# Patient Record
Sex: MALE | Race: WHITE | NOT HISPANIC OR LATINO | URBAN - METROPOLITAN AREA
[De-identification: names, ages, dates, MRNs, and addresses within clinical notes are randomized per-mention and may not be internally consistent; named-entity substitution may affect disease eponyms.]

---

## 2021-05-23 ENCOUNTER — EMERGENCY (EMERGENCY)
Facility: HOSPITAL | Age: 26
LOS: 0 days | Discharge: HOME | End: 2021-05-23
Attending: EMERGENCY MEDICINE | Admitting: EMERGENCY MEDICINE
Payer: COMMERCIAL

## 2021-05-23 VITALS
DIASTOLIC BLOOD PRESSURE: 86 MMHG | WEIGHT: 130.07 LBS | TEMPERATURE: 98 F | RESPIRATION RATE: 18 BRPM | SYSTOLIC BLOOD PRESSURE: 141 MMHG | HEIGHT: 66 IN | OXYGEN SATURATION: 100 % | HEART RATE: 97 BPM

## 2021-05-23 DIAGNOSIS — R06.02 SHORTNESS OF BREATH: ICD-10-CM

## 2021-05-23 DIAGNOSIS — R00.2 PALPITATIONS: ICD-10-CM

## 2021-05-23 PROCEDURE — 71045 X-RAY EXAM CHEST 1 VIEW: CPT | Mod: 26

## 2021-05-23 PROCEDURE — 99284 EMERGENCY DEPT VISIT MOD MDM: CPT

## 2021-05-23 PROCEDURE — 93010 ELECTROCARDIOGRAM REPORT: CPT

## 2021-05-23 NOTE — ED PROVIDER NOTE - CLINICAL SUMMARY MEDICAL DECISION MAKING FREE TEXT BOX
25M p/w sob while sleeping- currently asymptomatic with no residual symptoms on ros or physical exam. pt is well appearing- here with his girlfriend. ekg-nl. ED CXR reviewed by me which did not reveal a ptx, infiltrate, or effusion. will fu with pmd. dc home with return precautions.

## 2021-05-23 NOTE — ED PROVIDER NOTE - PATIENT PORTAL LINK FT
You can access the FollowMyHealth Patient Portal offered by [facility] by registering at the following website: [email or web address]. By joining [employer]’s FollowMyHealth portal, you will also be able to view your health information using other applications (apps) compatible with our system.

## 2021-05-23 NOTE — ED PROVIDER NOTE - PHYSICAL EXAMINATION
Gen: awake, alert, nad   Eyes: perrl, eomi   Ent: moist mm, nl voice  Neck: from, supple, (-)jvd ,c-spine tender/step-offs/lymphadenopathy/meningismus   Cv: s1,s2, rrr  Chest: ctab  Abd: soft,ntnd  Ext: from on all ext, distal pulses present, (-) edema   Skin: nl color for race (-)rash   Neuro: anox3, cn 2-12 grossly intact motor/sensory

## 2021-05-23 NOTE — ED ADULT TRIAGE NOTE - CHIEF COMPLAINT QUOTE
pt sts woke up and had a hard time breathing. sts cant take air into chest. denies cp, cough, fever. no stridor noted.

## 2021-05-23 NOTE — ED PROVIDER NOTE - NS ED ROS FT
Constitutional:  No fevers/chills.   Head: No headache, LOC, dizziness   Eyes:  No visual changes, eye pain, redness, or discharge;   ENMT:  No ear pain or discharge.   Neck:  No pain, loss of range of motion.   Cardiac: No chest pain, (+) palpitations,   Chest/respiratory: No cough, wheezing, (+) shortness of breath,   GI: No abd pain, n/v/d, melena/brbpr.   :  No dysuria   MS: No pain, weakness, injury, numbness or tingling, edema.   Back:  No pain or injury.   Skin:  No rashes or color changes; no lacerations or abrasions.  Social: No sick contacts, recent travel or rash.

## 2021-05-23 NOTE — ED PROVIDER NOTE - OBJECTIVE STATEMENT
25M p/w sob, palpitations, and throat closing sensation after waking up from sleep in the middle of the night. Lasted for few min, resolved by the time pt arrived at the ER. Nothing made it better or worse- no meds take- denies fevers, chills, n/v, cp, stressors, or substance abuse.

## 2022-11-08 NOTE — ED ADULT NURSE NOTE - OBJECTIVE STATEMENT
08-Nov-2022 07:50 pt complains of chest pain since today, denies fever, sob, cough, n/v, dizziness, palpitations